# Patient Record
Sex: FEMALE | Race: WHITE
[De-identification: names, ages, dates, MRNs, and addresses within clinical notes are randomized per-mention and may not be internally consistent; named-entity substitution may affect disease eponyms.]

---

## 2018-10-06 ENCOUNTER — HOSPITAL ENCOUNTER (EMERGENCY)
Dept: HOSPITAL 56 - MW.ED | Age: 28
Discharge: HOME | End: 2018-10-06
Payer: COMMERCIAL

## 2018-10-06 DIAGNOSIS — N39.0: Primary | ICD-10-CM

## 2018-10-06 PROCEDURE — 81025 URINE PREGNANCY TEST: CPT

## 2018-10-06 PROCEDURE — 87086 URINE CULTURE/COLONY COUNT: CPT

## 2018-10-06 PROCEDURE — 96372 THER/PROPH/DIAG INJ SC/IM: CPT

## 2018-10-06 PROCEDURE — 81001 URINALYSIS AUTO W/SCOPE: CPT

## 2018-10-06 PROCEDURE — 99283 EMERGENCY DEPT VISIT LOW MDM: CPT

## 2018-10-06 PROCEDURE — 87088 URINE BACTERIA CULTURE: CPT

## 2018-10-06 PROCEDURE — 87186 SC STD MICRODIL/AGAR DIL: CPT

## 2018-10-06 NOTE — EDM.PDOC
ED HPI GENERAL MEDICAL PROBLEM





- General


Chief Complaint: Genitourinary Problem


Stated Complaint: blood in urine


Time Seen by Provider: 10/06/18 09:39


Source of Information: Reports: Patient


History Limitations: Reports: No Limitations





- History of Present Illness


INITIAL COMMENTS - FREE TEXT/NARRATIVE: 


History of present illness:


[]Patient's had 2 day history of urinary discomfort. She has had UTIs in the 

past but has never had blood in her urine until this morning. She has had some 

fevers and chills but no abdominal pain, nausea, vomiting or diarrhea.





Review of systems: 


As per history of present illness and below otherwise all systems reviewed and 

negative.





Past medical history: 


As per history of present illness and as reviewed below otherwise 

noncontributory.





Surgical history: 


As per history of present illness and as reviewed below otherwise 

noncontributory.





Social history: 


No reported history of drug or alcohol abuse.





Family history: 


As per history of present illness and as reviewed below otherwise 

noncontributory.





Physical exam:


General: Well developed, well nourished in NAD


HEENT: Atraumatic, normocephalic, pupils reactive, negative for conjunctival 

pallor or scleral icterus, mucous membranes moist, throat clear, neck supple, 

nontender, trachea midline.


Lungs: Clear to auscultation, breath sounds equal bilaterally, chest nontender.


Heart: S1S2, regular, negative for clicks, rubs, or JVD.


Abdomen: Soft, nondistended, nontender. Negative for masses or 

hepatosplenomegaly. Negative for costovertebral tenderness.


Pelvis: Stable nontender.


Genitourinary: Deferred.


Rectal: Deferred.


Extremities: Atraumatic, negative for cords or calf pain. Neurovascular 

unremarkable.


Neuro: Awake, alert, oriented. Cranial nerves II through XII unremarkable. 

Cerebellum unremarkable. Motor and sensory unremarkable throughout. Exam 

nonfocal.


Skin:warm and dry





Diagnostics:


UA-2 to count WBCs and RBCs positive leuk esterase, urine culture, hCG





Therapeutics:


Rocephin IM





ED Course:


Unremarkable





Impression: 


UTI





Prescriptions:


Bactrim, Pyridium





Plan:


Take meds as directed, increase fluids, follow-up with primary care, return if 

symptoms worsen or change.





Definitive disposition and diagnosis as appropriate pending reevaluation and 

review of above.





  ** Kidneys


Pain Score (Numeric/FACES): 8





- Related Data


 Allergies











Allergy/AdvReac Type Severity Reaction Status Date / Time


 


No Known Allergies Allergy   Verified 10/06/18 09:23











Home Meds: 


 Home Meds





Amitriptyline [Elavil] 5 mg PO DAILY 10/06/18 [History]


Phenazopyridine [Pyridium] 100 mg PO TID #9 tab 10/06/18 [Rx]


Sulfamethoxazole/Trimethoprim [Bactrim Ds Tablet] 1 each PO BID #20 tablet 10/06

/18 [Rx]











Past Medical History


HEENT History: Reports: None


Cardiovascular History: Reports: None


Respiratory History: Reports: None


Gastrointestinal History: Reports: None


Genitourinary History: Reports: UTI, Recurrent


OB/GYN History: Reports: None


Musculoskeletal History: Reports: None


Neurological History: Reports: None


Psychiatric History: Reports: None


Endocrine/Metabolic History: Reports: None


Dermatologic History: Reports: None





- Infectious Disease History


Infectious Disease History: Reports: Chicken Pox





- Past Surgical History


Female  Surgical History: Reports: Other (See Below)


Other Female  Surgeries/Procedures: "Kidney sugery" at 21 per patient.





Social & Family History





- Family History


Family Medical History: Noncontributory





- Tobacco Use


Smoking Status *Q: Never Smoker





- Caffeine Use


Caffeine Use: Reports: Coffee





- Recreational Drug Use


Recreational Drug Use: No





ED ROS GENERAL





- Review of Systems


Review Of Systems: ROS reveals no pertinent complaints other than HPI.





ED EXAM, RENAL/





- Physical Exam


Exam: See Below (see history of present illness)





Course





- Vital Signs


Last Recorded V/S: 


 Last Vital Signs











Temp  96.9 F   10/06/18 09:20


 


Pulse  91   10/06/18 09:20


 


Resp  16   10/06/18 09:20


 


BP  135/106 H  10/06/18 09:20


 


Pulse Ox  97   10/06/18 09:20














- Orders/Labs/Meds


Orders: 


 Active Orders 24 hr











 Category Date Time Status


 


 CULTURE URINE [RM] Stat Lab  10/06/18 09:30 Received


 


 cefTRIAXone [Rocephin] 1,000 mg Med  10/06/18 10:06 Active





 Lidocaine 1% [Xylocaine-MPF 1%] 1 ml   





 IM ONETIME   








 Medication Orders





Ceftriaxone Sodium 1,000 mg/ (Lidocaine HCl)  1 mls @ 1 mls/sec IM ONETIME ONE


   Stop: 10/06/18 10:07








Labs: 


 Laboratory Tests











  10/06/18 10/06/18 Range/Units





  09:30 09:30 


 


Urine Color  YELLOW   


 


Urine Appearance  SLT CLOUDY   


 


Urine pH  6.5   (5.0-8.0)  


 


Ur Specific Gravity  1.025   (1.001-1.035)  


 


Urine Protein  100   (NEGATIVE)  mg/dL


 


Urine Glucose (UA)  NEGATIVE   (NEGATIVE)  mg/dL


 


Urine Ketones  NEGATIVE   (NEGATIVE)  mg/dL


 


Urine Occult Blood  LARGE H   (NEGATIVE)  


 


Urine Nitrite  NEGATIVE   (NEGATIVE)  


 


Urine Bilirubin  SMALL H   (NEGATIVE)  


 


Urine Ictotest     


 


Urine Urobilinogen  0.2   (<2.0)  EU/dL


 


Ur Leukocyte Esterase  LARGE   (NEGATIVE)  


 


Urine RBC  TOO NUMEROUS TO CT H   (0-2/HPF)  


 


Urine WBC  TO NUMEROUS TO COUNT H   (0-5/HPF)  


 


Ur Epithelial Cells  FEW   (NONE-FEW)  


 


Urine Bacteria  1+ H   (NEGATIVE)  


 


Urinalysis Comment     


 


Urine HCG, Qual   NEGATIVE  (NEGATIVE)  











Meds: 


Medications











Generic Name Dose Route Start Last Admin





  Trade Name Freq  PRN Reason Stop Dose Admin


 


Ceftriaxone Sodium 1,000 mg/  1 mls @ 1 mls/sec  10/06/18 10:06  





  Lidocaine HCl  IM  10/06/18 10:07  





  ONETIME ONE   





     





     





     





     














Departure





- Departure


Time of Disposition: 10:20


Disposition: Home, Self-Care 01


Condition: Good


Clinical Impression: 


UTI (urinary tract infection)


Qualifiers:


 Urinary tract infection type: site unspecified Hematuria presence: with 

hematuria Qualified Code(s): N39.0 - Urinary tract infection, site not specified

; R31.9 - Hematuria, unspecified








- Discharge Information


*PRESCRIPTION DRUG MONITORING PROGRAM REVIEWED*: No


*COPY OF PRESCRIPTION DRUG MONITORING REPORT IN PATIENT LILIANA: No


Prescriptions: 


Phenazopyridine [Pyridium] 100 mg PO TID #9 tab


Sulfamethoxazole/Trimethoprim [Bactrim Ds Tablet] 1 each PO BID #20 tablet


Referrals: 


PCP,None [Primary Care Provider] - 


Forms:  ED Department Discharge


Additional Instructions: 


The following information is given to patients seen in the emergency department 

who are being discharged to home. This information is to outline your options 

for follow-up care. We provide all patients seen in our emergency department 

with a follow-up referral.





The need for follow-up, as well as the timing and circumstances, are variable 

depending upon the specifics of your emergency department visit.





If you don't have a primary care physician on staff, we will provide you with a 

referral. We always advise you to contact your personal physician following an 

emergency department visit to inform them of the circumstance of the visit and 

for follow-up with them and/or the need for any referrals to a consulting 

specialist.





The emergency department will also refer you to a specialist when appropriate. 

This referral assures that you have the opportunity for follow-up care with a 

specialist. All of these measure are taken in an effort to provide you with 

optimal care, which includes your follow-up.





Under all circumstances we always encourage you to contact your private 

physician who remains a resource for coordinating your care. When calling for 

follow-up care, please make the office aware that this follow-up is from your 

recent emergency room visit. If for any reason you are refused follow-up, 

please contact the [facility] Emergency 

Department at [phone] and asked to speak to the emergency department 

charge nurse.





Take meds as directed and increase fluids until urine is clear,  follow-up with 

primary care, return if symptoms worsen or change.





[facility]


Primary Care


[address] [place] [id number]


Phone: [phone]


Fax: [phone]





- My Orders


Last 24 Hours: 


My Active Orders





10/06/18 09:30


CULTURE URINE [RM] Stat 





10/06/18 10:06


cefTRIAXone [Rocephin] 1,000 mg   Lidocaine 1% [Xylocaine-MPF 1%] 1 ml IM 

ONETIME 














- Assessment/Plan


Last 24 Hours: 


My Active Orders





10/06/18 09:30


CULTURE URINE [RM] Stat 





10/06/18 10:06


cefTRIAXone [Rocephin] 1,000 mg   Lidocaine 1% [Xylocaine-MPF 1%] 1 ml IM 

ONETIME

## 2020-05-09 NOTE — PCM.PREANE
Preanesthetic Assessment





- Procedure


Proposed Procedure: 


Continuous Labor Epidural








- Anesthesia/Transfusion/Family Hx


Anesthesia History: Prior Anesthesia Without Reaction


Transfusion History: No Prior Transfusion(s)





- Review of Systems


General: No Symptoms


Pulmonary: No Symptoms


Cardiovascular: No Symptoms


Gastrointestinal: No Symptoms


Neurological: No Symptoms


Other: Reports: None





- Physical Assessment


Height: 5 ft 2 in


Weight: 86.183 kg


ASA Class: 2


Mental Status: Alert & Oriented x3


Airway Class: Mallampati = 2


Dentition: Reports: Normal Dentition


Thyro-Mental Finger Breadths: 3


Mouth Opening Finger Breadths: 3


ROM/Head Extension: Full


Lungs: Clear to Auscultation, Normal Respiratory Effort


Cardiovascular: Regular Rate, Regular Rhythm





- Lab


Values: 





 Laboratory Last Values











WBC  10.70 K/uL (4.0-11.0)   20  11:18    


 


RBC  3.91 M/uL (4.30-5.90)  L  20  11:18    


 


Hgb  12.5 g/dL (12.0-16.0)   20  11:18    


 


Hct  35.9 % (36.0-46.0)  L  20  11:18    


 


MCV  91.8 fL (80.0-98.0)   20  11:18    


 


MCH  32.0 pg (27.0-32.0)   20  11:18    


 


MCHC  34.8 g/dL (31.0-37.0)   20  11:18    


 


RDW Std Deviation  44.4 fl (28.0-62.0)   20  11:18    


 


RDW Coeff of Nneka  13 % (11.0-15.0)   20  11:18    


 


Plt Count  122 K/uL (150-400)  L  20  11:18    


 


MPV  11.00 fL (7.40-12.00)   20  11:18    


 


Nucleated RBC %  0.0 /100WBC  20  11:18    


 


Nucleated RBCs #  0 K/uL  20  11:18    


 


Sodium  136 mmol/L (136-145)   20  11:18    


 


Potassium  3.8 mmol/L (3.5-5.1)   20  11:18    


 


Chloride  102 mmol/L ()   20  11:18    


 


Carbon Dioxide  23.0 mmol/L (21.0-32.0)   20  11:18    


 


BUN  7 mg/dL (7.0-18.0)   20  11:18    


 


Creatinine  0.6 mg/dL (0.6-1.0)   20  11:18    


 


Est Cr Clr Drug Dosing  TNP   20  11:18    


 


Estimated GFR (MDRD)  > 60.0 ml/min  20  11:18    


 


Glucose  78 mg/dL ()   20  11:18    


 


Uric Acid  4.8 mg/dL (2.6-7.2)   20  11:18    


 


Calcium  8.3 mg/dL (8.5-10.1)  L  20  11:18    


 


Total Bilirubin  0.6 mg/dL (0.2-1.0)   20  11:18    


 


AST  20 IU/L (15-37)   20  11:18    


 


ALT  21 IU/L (14-63)   20  11:18    


 


Alkaline Phosphatase  135 U/L ()  H  20  11:18    


 


Total Protein  6.2 g/dL (6.4-8.2)  L  20  11:18    


 


Albumin  2.9 g/dL (3.4-5.0)  L  20  11:18    


 


Globulin  3.3 g/dL (2.6-4.0)   20  11:18    


 


Albumin/Globulin Ratio  0.9  (0.9-1.6)   20  11:18    


 


Urine Color  YELLOW   20  11:10    


 


Urine Appearance  CLEAR   20  11:10    


 


Urine pH  7.0  (5.0-8.0)   20  11:10    


 


Ur Specific Gravity  1.020  (1.001-1.035)   20  11:10    


 


Urine Protein  NEGATIVE mg/dL (NEGATIVE)   20  11:10    


 


Urine Glucose (UA)  NEGATIVE mg/dL (NEGATIVE)   20  11:10    


 


Urine Ketones  NEGATIVE mg/dL (NEGATIVE)   20  11:10    


 


Urine Occult Blood  TRACE-INTACT  (NEGATIVE)  H  20  11:10    


 


Urine Nitrite  NEGATIVE  (NEGATIVE)   20  11:10    


 


Urine Bilirubin  NEGATIVE  (NEGATIVE)   20  11:10    


 


Urine Urobilinogen  0.2 EU/dL (<2.0)   20  11:10    


 


Ur Leukocyte Esterase  NEGATIVE  (NEGATIVE)   20  11:10    


 


Ur Random Creatinine  97.8 mg/dL  20  11:00    


 


U Random Total Protein  14.7 mg/dL (<11.9)  H  20  11:00    


 


Protein/Creatinin Ratio  0.2   20  11:00    


 


Fetal Membrane Rupture  POSITIVE   20  10:40    


 


Blood Type  O POSITIVE   20  11:18    


 


Antibody Screen  NEGATIVE   20  11:18    














- Allergies


Allergies/Adverse Reactions: 


 Allergies











Allergy/AdvReac Type Severity Reaction Status Date / Time


 


No Known Allergies Allergy   Verified 10/06/18 09:23














- Anesthesia Plan


Free Text/Narrative:: 


Continuous Labor Epidural





- Acknowledgements


Anesthesia Type Planned: Spinal


Pt an Appropriate Candidate for the Planned Anesthesia: Yes


Alternatives and Risks of Anesthesia Discussed w Pt/Guardian: Yes


Pt/Guardian Understands and Agrees with Anesthesia Plan: Yes





PreAnesthesia Questionnaire


HEENT History: Reports: None


Cardiovascular History: Reports: Hypertension


Respiratory History: Reports: None


Gastrointestinal History: Reports: None


Genitourinary History: Reports: UTI, Recurrent


OB/GYN History: Reports: Pregnancy


: 1


Para: 0


LMP (Approximate): Pregnant


Musculoskeletal History: Reports: None


Neurological History: Reports: None


Psychiatric History: Reports: None


Endocrine/Metabolic History: Reports: None


Hematologic History: Reports: None


Immunologic History: Reports: None


Oncologic (Cancer) History: Reports: None


Dermatologic History: Reports: None





- Infectious Disease History


Infectious Disease History: Reports: Chicken Pox, Human Papilloma Virus (HPV)





- Past Surgical History


GI Surgical History: Reports: Appendectomy


Female  Surgical History: Reports: Other (See Below)


Other Female  Surgeries/Procedures: "Kidney sugery" at 21 per patient. Left 

ureteral stent & repair.


Oncologic Surgical History: Reports: Lumpectomy


Other Oncologic Surgeries/Procedures: Right Lumpectomy





- SUBSTANCE USE


Smoking Status *Q: Never Smoker


Second Hand Smoke Exposure: No


Recreational Drug Use History: No





- HOME MEDS


Home Medications: 


 Home Meds





Amitriptyline [Elavil] 5 mg PO DAILY 10/06/18 [History]


Phenazopyridine [Pyridium] 100 mg PO TID #9 tab 10/06/18 [Rx]


Sulfamethoxazole/Trimethoprim [Bactrim Ds Tablet] 1 each PO BID #20 tablet 10/06

/18 [Rx]


Acetaminophen [Tylenol] 1 cap PO ASDIRECTED PRN 20 [History]


Magnesium Oxide [Mag-Oxide] 200 mg PO DAILY 20 [History]


Nitrofurantoin Monohyd/M-Cryst [Macrobid 100 mg Capsule] 100 mg PO DAILY  [History]


Pnv No.95/Ferrous Fum/Folic AC [Prenatal Vitamin Tablet] 1 tab PO DAILY  [History]











- CURRENT (IN HOUSE) MEDS


Current Meds: 





 Current Medications





Butorphanol Tartrate (Stadol)  1 mg IVPUSH Q1H PRN


   PRN Reason: Pain


   Last Admin: 20 18:31 Dose:  1 mg


Carboprost Tromethamine (Hemabate Ds)  250 mcg IM ASDIRECTED PRN


   PRN Reason: Post Partum Hemorrhage


Tranexamic Acid 1,000 mg/ (Sodium Chloride)  110 mls @ 660 mls/hr IV ONETIME PRN


   PRN Reason: Bleeding


Lactated Ringer's (Ringers, Lactated)  1,000 mls @ 150 mls/hr IV ASDIRECTED VANIA


   Last Infusion: 20 20:25 Dose:  150 mls/hr


Oxytocin/Sodium Chloride (Oxytocin 30 Unit/500 Ml-Ns)  30 unit in 500 mls @ 500 

mls/hr IV ASDIRECTED VANIA


Oxytocin/Sodium Chloride (Oxytocin 30 Unit/500 Ml-Ns)  30 unit in 500 mls @ 2 

mls/hr IV TITRATE VANIA; Protocol


   Last Titration: 20 14:15 Dose:  8 munits/min, 8 mls/hr


Lidocaine HCl (Xylocaine 1%)  50 ml INJECT ONETIME PRN


   PRN Reason: Laceration repair


Methylergonovine Maleate (Methergine)  0.2 mg IM ASDIRECTED PRN


   PRN Reason: Post Partum Hemorrhage


Misoprostol (Cytotec)  200 mcg PO ONETIME PRN


   PRN Reason: Post Partum Hemorrhage


Nalbuphine HCl (Nubain)  10 mg IVPUSH Q1H PRN


   PRN Reason: Pain (severe 7-10)


Sodium Chloride (Saline Flush)  10 ml FLUSH ASDIRECTED PRN


   PRN Reason: Keep Vein Open


Sodium Chloride (Saline Flush)  2.5 ml FLUSH ASDIRECTED PRN


   PRN Reason: Keep Vein Open


Sodium Chloride (Normal Saline)  10 ml IV ASDIRECTED PRN


   PRN Reason: IV Use


Sterile Water (Sterile Water For Irrigation)  1,000 ml IRR ASDIRECTED PRN


   PRN Reason: delivery


Terbutaline Sulfate (Brethine)  0.25 mg SUBCUT ASDIRECTED PRN


   PRN Reason: Tacysystole





Discontinued Medications





Fentanyl/Bupivacaine HCl (Fentanyl/Bupivacaine/Ns 2 Mcg-0.125% 250 Ml) Confirm 

Administered Dose 250 mls @ as directed .ROUTE .[facility]


   Stop: 20 19:37

## 2020-05-10 NOTE — PCM.DEL
L & D Note





- General Info


Date of Service: 05/10/20


Mother's Due Date: 20





- Delivery Note


Labor: Induced by Oxytocin


Delivery Outcome: Livebirth


Infant Delivery Method: Spontaneous Vaginal Delivery-Single


Infant Delivery Mode: Spontaneous


Birth Presentation: Left Occiput Posterior (LOP)


Nuchal Cord: None


Prep: Other


Anesthesia Type: Epidural, Local


Anesthetic: Lidocaine (Xylocaine) 0.5% Plain


Local Anesthetic Volume: Other (10 ml)


Amniotic Fluid Description: Clear


Episiotomy Type: None


Laceration: Vaginal


Suture type: Vicryl


Suture size: 3-0


Placenta: Intact, Spontaneous


Cord: 3 Vessels


Estimated Blood Loss: 200


Resuscitation Needed: No


: Suctioned


APGAR Score 1 min: 8


APGAR Score 5 min: 9


Delivery Comments (Free Text/Narrative):: 





PROM with induction, gestational hypertension, negative pre-clamptic 

evaluation.  Liveborn Male 2880 grams. 





- General Info


Date of Service: 05/10/20





- Patient Data


Weight - Most Recent: 86.183 kg


Lab Results Last 24 Hours: 


 Laboratory Results - last 24 hr











  20 Range/Units





  10:40 11:00 11:10 


 


WBC     (4.0-11.0)  K/uL


 


RBC     (4.30-5.90)  M/uL


 


Hgb     (12.0-16.0)  g/dL


 


Hct     (36.0-46.0)  %


 


MCV     (80.0-98.0)  fL


 


MCH     (27.0-32.0)  pg


 


MCHC     (31.0-37.0)  g/dL


 


RDW Std Deviation     (28.0-62.0)  fl


 


RDW Coeff of Nneka     (11.0-15.0)  %


 


Plt Count     (150-400)  K/uL


 


MPV     (7.40-12.00)  fL


 


Nucleated RBC %     /100WBC


 


Nucleated RBCs #     K/uL


 


Sodium     (136-145)  mmol/L


 


Potassium     (3.5-5.1)  mmol/L


 


Chloride     ()  mmol/L


 


Carbon Dioxide     (21.0-32.0)  mmol/L


 


BUN     (7.0-18.0)  mg/dL


 


Creatinine     (0.6-1.0)  mg/dL


 


Est Cr Clr Drug Dosing     


 


Estimated GFR (MDRD)     ml/min


 


Glucose     ()  mg/dL


 


Uric Acid     (2.6-7.2)  mg/dL


 


Calcium     (8.5-10.1)  mg/dL


 


Total Bilirubin     (0.2-1.0)  mg/dL


 


AST     (15-37)  IU/L


 


ALT     (14-63)  IU/L


 


Alkaline Phosphatase     ()  U/L


 


Total Protein     (6.4-8.2)  g/dL


 


Albumin     (3.4-5.0)  g/dL


 


Globulin     (2.6-4.0)  g/dL


 


Albumin/Globulin Ratio     (0.9-1.6)  


 


Urine Color    YELLOW  


 


Urine Appearance    CLEAR  


 


Urine pH    7.0  (5.0-8.0)  


 


Ur Specific Gravity    1.020  (1.001-1.035)  


 


Urine Protein    NEGATIVE  (NEGATIVE)  mg/dL


 


Urine Glucose (UA)    NEGATIVE  (NEGATIVE)  mg/dL


 


Urine Ketones    NEGATIVE  (NEGATIVE)  mg/dL


 


Urine Occult Blood    TRACE-INTACT H  (NEGATIVE)  


 


Urine Nitrite    NEGATIVE  (NEGATIVE)  


 


Urine Bilirubin    NEGATIVE  (NEGATIVE)  


 


Urine Urobilinogen    0.2  (<2.0)  EU/dL


 


Ur Leukocyte Esterase    NEGATIVE  (NEGATIVE)  


 


Ur Random Creatinine   97.8   mg/dL


 


U Random Total Protein   14.7 H   (<11.9)  mg/dL


 


Protein/Creatinin Ratio   0.2   


 


Fetal Membrane Rupture  POSITIVE    


 


Blood Type     


 


Antibody Screen     














  20 Range/Units





  11:18 11:18 11:18 


 


WBC  10.70    (4.0-11.0)  K/uL


 


RBC  3.91 L    (4.30-5.90)  M/uL


 


Hgb  12.5    (12.0-16.0)  g/dL


 


Hct  35.9 L    (36.0-46.0)  %


 


MCV  91.8    (80.0-98.0)  fL


 


MCH  32.0    (27.0-32.0)  pg


 


MCHC  34.8    (31.0-37.0)  g/dL


 


RDW Std Deviation  44.4    (28.0-62.0)  fl


 


RDW Coeff of Nneka  13    (11.0-15.0)  %


 


Plt Count  122 L    (150-400)  K/uL


 


MPV  11.00    (7.40-12.00)  fL


 


Nucleated RBC %  0.0    /100WBC


 


Nucleated RBCs #  0    K/uL


 


Sodium   136   (136-145)  mmol/L


 


Potassium   3.8   (3.5-5.1)  mmol/L


 


Chloride   102   ()  mmol/L


 


Carbon Dioxide   23.0   (21.0-32.0)  mmol/L


 


BUN   7   (7.0-18.0)  mg/dL


 


Creatinine   0.6   (0.6-1.0)  mg/dL


 


Est Cr Clr Drug Dosing   TNP   


 


Estimated GFR (MDRD)   > 60.0   ml/min


 


Glucose   78   ()  mg/dL


 


Uric Acid   4.8   (2.6-7.2)  mg/dL


 


Calcium   8.3 L   (8.5-10.1)  mg/dL


 


Total Bilirubin   0.6   (0.2-1.0)  mg/dL


 


AST   20   (15-37)  IU/L


 


ALT   21   (14-63)  IU/L


 


Alkaline Phosphatase   135 H   ()  U/L


 


Total Protein   6.2 L   (6.4-8.2)  g/dL


 


Albumin   2.9 L   (3.4-5.0)  g/dL


 


Globulin   3.3   (2.6-4.0)  g/dL


 


Albumin/Globulin Ratio   0.9   (0.9-1.6)  


 


Urine Color     


 


Urine Appearance     


 


Urine pH     (5.0-8.0)  


 


Ur Specific Gravity     (1.001-1.035)  


 


Urine Protein     (NEGATIVE)  mg/dL


 


Urine Glucose (UA)     (NEGATIVE)  mg/dL


 


Urine Ketones     (NEGATIVE)  mg/dL


 


Urine Occult Blood     (NEGATIVE)  


 


Urine Nitrite     (NEGATIVE)  


 


Urine Bilirubin     (NEGATIVE)  


 


Urine Urobilinogen     (<2.0)  EU/dL


 


Ur Leukocyte Esterase     (NEGATIVE)  


 


Ur Random Creatinine     mg/dL


 


U Random Total Protein     (<11.9)  mg/dL


 


Protein/Creatinin Ratio     


 


Fetal Membrane Rupture     


 


Blood Type    O POSITIVE  


 


Antibody Screen    NEGATIVE  











Med Orders - Current: 


 Current Medications





Butorphanol Tartrate (Stadol)  1 mg IVPUSH Q1H PRN


   PRN Reason: Pain


   Last Admin: 20 18:31 Dose:  1 mg


Carboprost Tromethamine (Hemabate Ds)  250 mcg IM ASDIRECTED PRN


   PRN Reason: Post Partum Hemorrhage


Tranexamic Acid 1,000 mg/ (Sodium Chloride)  110 mls @ 660 mls/hr IV ONETIME PRN


   PRN Reason: Bleeding


Lactated Ringer's (Ringers, Lactated)  1,000 mls @ 150 mls/hr IV ASDIRECTED VANIA


   Last Admin: 20 23:59 Dose:  500 mls/hr


Oxytocin/Sodium Chloride (Oxytocin 30 Unit/500 Ml-Ns)  30 unit in 500 mls @ 500 

mls/hr IV ASDIRECTED VANIA


Oxytocin/Sodium Chloride (Oxytocin 30 Unit/500 Ml-Ns)  30 unit in 500 mls @ 2 

mls/hr IV TITRATE VANIA; Protocol


   Last Titration: 20 14:15 Dose:  8 munits/min, 8 mls/hr


Lidocaine HCl (Xylocaine 1%)  50 ml INJECT ONETIME PRN


   PRN Reason: Laceration repair


Methylergonovine Maleate (Methergine)  0.2 mg IM ASDIRECTED PRN


   PRN Reason: Post Partum Hemorrhage


Misoprostol (Cytotec)  200 mcg PO ONETIME PRN


   PRN Reason: Post Partum Hemorrhage


Nalbuphine HCl (Nubain)  10 mg IVPUSH Q1H PRN


   PRN Reason: Pain (severe 7-10)


Sodium Chloride (Saline Flush)  10 ml FLUSH ASDIRECTED PRN


   PRN Reason: Keep Vein Open


Sodium Chloride (Saline Flush)  2.5 ml FLUSH ASDIRECTED PRN


   PRN Reason: Keep Vein Open


Sodium Chloride (Normal Saline)  10 ml IV ASDIRECTED PRN


   PRN Reason: IV Use


Sterile Water (Sterile Water For Irrigation)  1,000 ml IRR ASDIRECTED PRN


   PRN Reason: delivery


Terbutaline Sulfate (Brethine)  0.25 mg SUBCUT ASDIRECTED PRN


   PRN Reason: Tacysystole





Discontinued Medications





Bupivacaine HCl (Sensorcaine-Mpf 0.25%) Confirm Administered Dose 10 ml .ROUTE 

.STK-MED ONE


   Stop: 20 23:53


Fentanyl (Sublimaze) Confirm Administered Dose 100 mcg .ROUTE .STK-MED ONE


   Stop: 20 23:26


Fentanyl/Bupivacaine HCl (Fentanyl/Bupivacaine/Ns 2 Mcg-0.125% 250 Ml) Confirm 

Administered Dose 250 mls @ as directed .ROUTE .STK-MED ONE


   Stop: 20 19:37











- Problem List & Annotations


(1) PROM with onset of labor within 24 hours, delivered, curr hospitaliz


SNOMED Code(s): 116523874, 899819500


   Code(s): O42.00 - EFREN ROM, ONSET LABOR W/N 24 HR OF RUPT, UNSP WEEKS OF 

GEST   Status: Acute   Current Visit: Yes   





(2) Vaginal delivery


SNOMED Code(s): 196884667


   Code(s): O80 - ENCOUNTER FOR FULL-TERM UNCOMPLICATED DELIVERY   Status: 

Acute   Current Visit: Yes   





(3) Gestational thrombocytopenia without hemorrhage


SNOMED Code(s): 673455072


   Code(s): O99.119 - OTH DIS OF BLD/BLD-FORM ORG/IMMUN MECHNSM COMP PREG,UNSP 

TRI; D69.6 - THROMBOCYTOPENIA, UNSPECIFIED   Status: Acute   Current Visit: Yes

   





(4) Chronic hypertension affecting pregnancy


SNOMED Code(s): 56738248


   Code(s): O10.919 - UNSP PRE-EXISTING HTN COMP PREGNANCY, UNSP TRIMESTER   

Status: Acute   Current Visit: Yes   





- Problem List Review


Problem List Initiated/Reviewed/Updated: Yes





- My Orders


Last 24 Hours: 


My Active Orders





20 10:52


Resuscitation Status Routine 





20 10:53


Fetal Non Stress Test [RC] PER UNIT ROUTINE 


Up ad Lynn [RC] ASDIRECTED 


Vaginal Exam [RC] Click to Edit 


Vital Signs [RC] PER UNIT ROUTINE 





20 11:18


RPR (SYPHILIS SERO) W/ RFLX [REF] Routine 





20 11:25


Patient Status [ADT] Routine 


Bedrest Bathroom Privileges [RC] ASDIRECTED 


Communication Order [RC] ASDIRECTED 


Communication Order [RC] ASDIRECTED 


Fetal Heart Tones [RC] CONTINUOUS 


May Shower [RC] ASDIRECTED 


Notify Provider [RC] PRN 


Notify Provider [RC] PRN 


Oxygen Therapy [RC] ASDIRECTED 


Up ad Lynn [RC] ASDIRECTED 


Vaginal Exam [RC] PRN 


Vaginal Exam [RC] PRN 


Vital Signs [RC] PER UNIT ROUTINE 


Vital Signs [RC] PER UNIT ROUTINE 


Butorphanol [Stadol]   1 mg IVPUSH Q1H PRN 


Carboprost Tromethamine [Hemabate DS]   250 mcg IM ASDIRECTED PRN 


Lidocaine 1% [Xylocaine 1%]   50 ml INJECT ONETIME PRN 


Methylergonovine [Methergine]   0.2 mg IM ASDIRECTED PRN 


Nalbuphine [Nubain]   10 mg IVPUSH Q1H PRN 


Sodium Chloride 0.9% [Normal Saline]   10 ml IV ASDIRECTED PRN 


Sodium Chloride 0.9% [Saline Flush]   10 ml FLUSH ASDIRECTED PRN 


Sodium Chloride 0.9% [Saline Flush]   2.5 ml FLUSH ASDIRECTED PRN 


Terbutaline [Brethine]   0.25 mg SUBCUT ASDIRECTED PRN 


Tranexamic Acid [Cyklokapron] 1,000 mg   Sodium Chloride 0.9% [Normal Saline] 

100 ml IV ONETIME 


Water For Irrigation,Sterile [Sterile Water for Irrigation]   1,000 ml IRR 

ASDIRECTED PRN 


miSOPROStoL [Cytotec]   200 mcg PO ONETIME PRN 


Fetal Scalp Electrode [WOMSER] Per Unit Routine 


Peripheral IV Insertion Adult [OM.PC] Routine 





20 11:30


Lactated Ringers [Ringers, Lactated] 1,000 ml IV ASDIRECTED 


Oxytocin/0.9 % Sodium Chloride [Oxytocin 30 Unit/500 ML-NS] 30 unit in 500 ml 

IV ASDIRECTED 


Oxytocin/0.9 % Sodium Chloride [Oxytocin 30 Unit/500 ML-NS] 30 unit in 500 ml 

IV TITRATE

## 2020-05-10 NOTE — PCM.PNPP
- General Info


Date of Service: 05/10/20


Functional Status: Reports: Pain Controlled, Tolerating Diet, Ambulating, 

Urinating





- Review of Systems


General: Reports: No Symptoms


HEENT: Reports: No Symptoms


Pulmonary: Reports: No Symptoms


Cardiovascular: Reports: No Symptoms


Gastrointestinal: Reports: No Symptoms


Genitourinary: Reports: No Symptoms


Musculoskeletal: Reports: No Symptoms


Skin: Reports: No Symptoms


Neurological: Reports: No Symptoms


Psychiatric: Reports: No Symptoms





- General Info


Date of Service: 05/10/20





- Patient Data


Vital Signs - Most Recent: 


 Last Vital Signs











Temp  36.4 C   05/10/20 07:43


 


Pulse  97   05/10/20 07:43


 


Resp  16   05/10/20 07:43


 


BP  121/84   05/10/20 07:43


 


Pulse Ox  100   05/10/20 07:43











Weight - Most Recent: 86.183 kg


Lab Results - Last 24 Hours: 


 Laboratory Results - last 24 hr











  05/10/20 05/10/20 Range/Units





  02:26 06:55 


 


WBC   20.36 H  (4.0-11.0)  K/uL


 


RBC   4.05 L  (4.30-5.90)  M/uL


 


Hgb   12.9  (12.0-16.0)  g/dL


 


Hct   37.3  (36.0-46.0)  %


 


MCV   92.1  (80.0-98.0)  fL


 


MCH   31.9  (27.0-32.0)  pg


 


MCHC   34.6  (31.0-37.0)  g/dL


 


RDW Std Deviation   44.2  (28.0-62.0)  fl


 


RDW Coeff of Nneka   13  (11.0-15.0)  %


 


Plt Count   145 L  (150-400)  K/uL


 


MPV   10.60  (7.40-12.00)  fL


 


Nucleated RBC %   0.0  /100WBC


 


Nucleated RBCs #   0  K/uL


 


Cord ABG pH  7.332   (7.18-7.38)  


 


Cord ABG Base Excess  -5   (-10--2)  


 


Cord VBG pH  7.439   (7.25-7.45)  


 


Cord VBG Base Excess  -4   (-10--2)  











Med Orders - Current: 


 Current Medications





Acetaminophen (Tylenol Extra Strength)  500 mg PO Q4H PRN


   PRN Reason: Pain


Acetaminophen (Tylenol Extra Strength)  1,000 mg PO Q4H PRN


   PRN Reason: Pain


Benzocaine/Menthol (Dermoplast Pain Relief 20%-0.5% Spray)  78 gm TOP 

ASDIRECTED PRN


   PRN Reason: Perineal Comfort Measure


Bisacodyl (Dulcolax)  10 mg RECTAL ONETIME PRN


   PRN Reason: Constipation


Docusate Sodium (Colace)  100 mg PO BID PRN


   PRN Reason: Constipation


Emollient Ointment (Lansinoh Hpa)  0 gm TOP ASDIRECTED PRN


   PRN Reason: Sore Nipples


Tranexamic Acid 1,000 mg/ (Sodium Chloride)  110 mls @ 660 mls/hr IV ONETIME PRN


   PRN Reason: Bleeding


Ibuprofen (Motrin)  400 mg PO Q4H PRN


   PRN Reason: Pain


Ibuprofen (Motrin)  800 mg PO Q6H PRN


   PRN Reason: Pain


   Last Admin: 05/10/20 09:33 Dose:  800 mg


Witch Hazel (Tucks)  1 pad TOP ASDIRECTED PRN


   PRN Reason: comfort care





Discontinued Medications





Bupivacaine HCl (Sensorcaine-Mpf 0.25%) Confirm Administered Dose 10 ml .ROUTE 

.STK-MED ONE


   Stop: 20 23:53


Butorphanol Tartrate (Stadol)  1 mg IVPUSH Q1H PRN


   PRN Reason: Pain


   Last Admin: 20 18:31 Dose:  1 mg


Carboprost Tromethamine (Hemabate Ds)  250 mcg IM ASDIRECTED PRN


   PRN Reason: Post Partum Hemorrhage


Fentanyl (Sublimaze) Confirm Administered Dose 100 mcg .ROUTE .STK-MED ONE


   Stop: 20 23:26


Tranexamic Acid 1,000 mg/ (Sodium Chloride)  110 mls @ 660 mls/hr IV ONETIME PRN


   PRN Reason: Bleeding


Lactated Ringer's (Ringers, Lactated)  1,000 mls @ 150 mls/hr IV ASDIRECTED [facility]


   Last Admin: 20 23:59 Dose:  500 mls/hr


Oxytocin/Sodium Chloride (Oxytocin 30 Unit/500 Ml-Ns)  30 unit in 500 mls @ 500 

mls/hr IV ASDIRECTED [facility]


   Last Infusion: 05/10/20 02:40 Dose:  500 mls/hr


Oxytocin/Sodium Chloride (Oxytocin 30 Unit/500 Ml-Ns)  30 unit in 500 mls @ 2 

mls/hr IV TITRATE VANIA; Protocol


   Last Titration: 05/10/20 02:25 Dose:  0 munits/min, 0 mls/hr


Fentanyl/Bupivacaine HCl (Fentanyl/Bupivacaine/Ns 2 Mcg-0.125% 250 Ml) Confirm 

Administered Dose 250 mls @ as directed .ROUTE .STK-MED ONE


   Stop: 20 19:37


Lidocaine HCl (Xylocaine 1%)  50 ml INJECT ONETIME PRN


   PRN Reason: Laceration repair


   Last Admin: 05/10/20 02:35 Dose:  50 ml


Methylergonovine Maleate (Methergine)  0.2 mg IM ASDIRECTED PRN


   PRN Reason: Post Partum Hemorrhage


Misoprostol (Cytotec)  200 mcg PO ONETIME PRN


   PRN Reason: Post Partum Hemorrhage


Nalbuphine HCl (Nubain)  10 mg IVPUSH Q1H PRN


   PRN Reason: Pain (severe 7-10)


Sodium Chloride (Saline Flush)  10 ml FLUSH ASDIRECTED PRN


   PRN Reason: Keep Vein Open


Sodium Chloride (Saline Flush)  2.5 ml FLUSH ASDIRECTED PRN


   PRN Reason: Keep Vein Open


Sodium Chloride (Normal Saline)  10 ml IV ASDIRECTED PRN


   PRN Reason: IV Use


Sterile Water (Sterile Water For Irrigation)  1,000 ml IRR ASDIRECTED PRN


   PRN Reason: delivery


   Last Admin: 05/10/20 02:30 Dose:  1,000 ml


Terbutaline Sulfate (Brethine)  0.25 mg SUBCUT ASDIRECTED PRN


   PRN Reason: Tacysystole











- Infant Interaction


Infant Disposition, Postpartum: [place] in Room with Family


Support Person: Significant Other





- Postpartum Recovery Exam


Fundal Tone: Firm


Fundal Level: 1 Fingerbreadths Below Umbilicus


Fundal Placement: Midline


Lochia Amount: Scant


Lochia Color: Rubra/Red


Perineum Description: Intact, Minimal Bruising/Swelling


Episiotomy/Laceration: None


Bladder Status: Voiding





- Exam


Physical Findings Comment:: 





patient is sleeping well.





- Problem List & Annotations


(1) PROM with onset of labor within 24 hours, delivered, curr hospitaliz


SNOMED Code(s): 939051581, 443358316


   Code(s): O42.00 - EFREN ROM, ONSET LABOR W/N 24 HR OF RUPT, UNSP WEEKS OF 

GEST   Status: Acute   Current Visit: Yes   





(2) Vaginal delivery


SNOMED Code(s): 195122328


   Code(s): O80 - ENCOUNTER FOR FULL-TERM UNCOMPLICATED DELIVERY   Status: 

Acute   Current Visit: Yes   





(3) Gestational thrombocytopenia without hemorrhage


SNOMED Code(s): 704739615


   Code(s): O99.119 - OTH DIS OF BLD/BLD-FORM ORG/IMMUN MECHNSM COMP PREG,UNSP 

TRI; D69.6 - THROMBOCYTOPENIA, UNSPECIFIED   Status: Acute   Current Visit: Yes

   





(4) Chronic hypertension affecting pregnancy


SNOMED Code(s): 61740080


   Code(s): O10.919 - UNSP PRE-EXISTING HTN COMP PREGNANCY, UNSP TRIMESTER   

Status: Acute   Current Visit: Yes   





- Problem List Review


Problem List Initiated/Reviewed/Updated: Yes





- My Orders


Last 24 Hours: 


My Active Orders





05/10/20 02:48


Acetaminophen [Tylenol Extra Strength]   1,000 mg PO Q4H PRN 


Acetaminophen [Tylenol Extra Strength]   500 mg PO Q4H PRN 


Benzocaine/Menthol [Dermoplast Pain Relief 20%-0.5% Spray]   78 gm TOP 

ASDIRECTED PRN 


Docusate Sodium [Colace]   100 mg PO BID PRN 


Ibuprofen [Motrin]   400 mg PO Q4H PRN 


Ibuprofen [Motrin]   800 mg PO Q6H PRN 


Lanolin [Lansinoh HPA]   See Dose Instructions  TOP ASDIRECTED PRN 


Tranexamic Acid [Cyklokapron] 1,000 mg   Sodium Chloride 0.9% [Normal Saline] 

100 ml IV ONETIME 


bisacodyL [Dulcolax]   10 mg RECTAL ONETIME PRN 


witch hazeL [Tucks]   1 pad TOP ASDIRECTED PRN 





05/10/20 02:49


May Shower [RC] ASDIRECTED 


Up ad Lynn [RC] ASDIRECTED 


Vital Signs [RC] PER UNIT ROUTINE 


Assess Lochia [WOMSER] Per Unit Routine 


Assess Uterine Involution [WOMSER] Per Unit Routine 


Perineal Care [OM.PC] Per Unit Routine 


Peripheral IV Discontinue [OM.PC] Routine 





05/10/20 Breakfast


Regular Diet [DIET] 














- Assessment


Assessment:: 


PPD 0 after , PROM with induction, BP improved after delivery








- Plan


Plan:: 


Continue postpartum care.

## 2020-05-10 NOTE — PCM.SN.2
- Free Text/Narrative


Note: 


5/9/2020 at 2315: Called for Patient with inadequate pain control. Patient 

dilated to 8 cm and rating pain 6/10 with contractions. Fentanyl 100 mcg given 

at 2330. Changed pump settings to 6 ml continuous infusion, 6 ml bolus option 

every 10 minutes with lockout max dose of 24ml/hr. Patient bolus 6 ml initiated 

via pump at approximately 2336. At 2043, patient continues to rate pain 5/10. 

Clinician bolus of 3 ml given via pump. Vital signs remain stable throughout. 

Patient repositioned by RN. At 2350, patient continues with minimal relief and 

rates pain 5/10. Bupivacaine 0.25% 5 ml given manually at 2352. At 0005, 

patient rates pain 2/10 and feels comfortable at this time. Dr. Pinto has been 

here and is assessing patient. Will continue to monitor as needed.

## 2020-05-10 NOTE — [id number]
Post Anesthesia Note





- EVALUATION WITHIN 48HRS OF ANESTHETIC


Vital Signs in Normal Range: Yes


Patient Participated in Evaluation: Yes


Respiratory Function Stable: Yes


Airway Patent: Yes


Cardiovascular Function Stable: Yes


Hydration Status Stable: Yes


Pain Control Satisfactory: Yes


Nausea and Vomiting Control Satisfactory: Yes


Mental Status Recovered: Yes


Vital Signs: 


 Last Vital Signs











Temp  36.4 C   05/10/20 07:43


 


Pulse  97   05/10/20 07:43


 


Resp  16   05/10/20 07:43


 


BP  121/84   05/10/20 07:43


 


Pulse Ox  100   05/10/20 07:43














- COMMENTS/OBSERVATIONS


Free Text/Narrative:: 





Patient lying in bed. Has has been up without difficulty. No anesthesia 

complaints or concerns at this time. No anesthesia complications noted.

## 2020-05-10 NOTE — OR
SURGEON:

Sada Pinto M.D.

 

DATE OF PROCEDURE:  05/10/2020

 

PREOPERATIVE DIAGNOSES:

1. A 38-6/7-week intrauterine pregnancy.

2. Premature rupture of membranes with induction.

3. Chronic hypertension.

4. Gestational thrombocytopenia.

 

POSTOPERATIVE DIAGNOSES:

1. A 38-6/7-week intrauterine pregnancy.

2. Premature rupture of membranes with induction.

3. Chronic hypertension.

4. Gestational thrombocytopenia.

 

PROCEDURES:

1. Pitocin induction of labor.

2. Term spontaneous vaginal delivery.

3. Repair of a vaginal laceration.

 

PRIMARY SURGEON:

Sada Pinto M.D.

 

ANESTHESIA:

Epidural and local.

 

ESTIMATED BLOOD LOSS:

Less than 200 mL.

 

FINDINGS:

A liveborn male, Apgar scores 8 and 9, weighing 2880 g.  Placenta, spontaneous,

Schultze, intact, with 3 vessels.  A vaginal laceration was repaired.

 

COMPLICATIONS:

None known.

 

DISPOSITION:

Mother and baby are in the LDR in good condition.

 

BRIEF HISTORY:

This is a 29-year-old female.  She is G1, P0; presents at 38-5/7 weeks'

gestation, spontaneous rupture of membranes, clear fluid, 3 cm; was started on

Pitocin overall through labor.  Category I fetal heart tones.  Initially, she

had elevated blood pressures on presentation.  She is known to have chronic

hypertension but has not required medications through pregnancy.  She had

preeclamptic lab workup, which was negative, particularly with no proteinuria.

She has had thrombocytopenia through the 3rd trimester, and platelets were

stable relative to those drawn in the clinic at 122.  She progressed to

complete.

 

DESCRIPTION OF PROCEDURE:

With the patient in the dorsal lithotomy position, the patient pushed over a 2-

hour time period to a 5+ station, at which time the head was delivered

spontaneously and atraumatically over the perineum with support with subsequent

delivery of the infant's shoulders and body without any difficulty.  The infant

was bulb suctioned by nose and mouth, and after the cord had ceased to pulsate,

it was doubly clamped and cut.  The infant was handed to the mother in the

presence of the nurse attending the delivery.  The infant was a liveborn male,

Apgar scores 8 and 9, weighing 2880 g.  Cord blood was collected for cord ABGs

as well as routine cord blood sampling.  Pitocin was initiated after delivery of

the infant to assist with delivery of the placenta, which was delivered

spontaneously, Schultze, intact, with 3 vessels.  Upon inspection of the pelvis

and perineum, there were no periurethral, vaginal sidewall, cervical, rectal, or

perineal lacerations.  There was a small vaginal lower-segment laceration

extending approximately 3 cm.  1% lidocaine was injected, and 3-0 Vicryl was

utilized in a running locked fashion to reapproximate the tissue.  Final sponge,

needle, and instrument counts were correct.  There were no known complications.

Mother and baby remained in the LDR in good condition.

 

 

KATY SAEZ

DD:  05/10/2020 02:54:50

DT:  05/10/2020 03:26:59

Job #:  [id number]

## 2020-05-11 NOTE — PCM.PNPP
- General Info


Date of Service: 20


Functional Status: Reports: Pain Controlled, Tolerating Diet, Ambulating, 

Urinating





- Review of Systems


General: Reports: No Symptoms


HEENT: Reports: No Symptoms


Pulmonary: Reports: No Symptoms


Cardiovascular: Reports: No Symptoms


Gastrointestinal: Reports: No Symptoms


Genitourinary: Reports: No Symptoms


Musculoskeletal: Reports: No Symptoms


Skin: Reports: No Symptoms


Neurological: Reports: No Symptoms


Psychiatric: Reports: No Symptoms





- Patient Data


Vital Signs - Most Recent: 


 Last Vital Signs











Temp  36.5 C   20 05:11


 


Pulse  89   20 05:11


 


Resp  18   20 05:11


 


BP  132/95 H  20 05:11


 


Pulse Ox  96   20 05:11











Weight - Most Recent: 190 lb


Med Orders - Current: 


 Current Medications





Acetaminophen (Tylenol Extra Strength)  500 mg PO Q4H PRN


   PRN Reason: Pain


Acetaminophen (Tylenol Extra Strength)  1,000 mg PO Q4H PRN


   PRN Reason: Pain


Benzocaine/Menthol (Dermoplast Pain Relief 20%-0.5% Spray)  78 gm TOP 

ASDIRECTED PRN


   PRN Reason: Perineal Comfort Measure


Bisacodyl (Dulcolax)  10 mg RECTAL ONETIME PRN


   PRN Reason: Constipation


Docusate Sodium (Colace)  100 mg PO BID PRN


   PRN Reason: Constipation


Emollient Ointment (Lansinoh Hpa)  0 gm TOP ASDIRECTED PRN


   PRN Reason: Sore Nipples


   Last Admin: 05/10/20 20:54 Dose:  7 gm


Tranexamic Acid 1,000 mg/ (Sodium Chloride)  110 mls @ 660 mls/hr IV ONETIME PRN


   PRN Reason: Bleeding


Ibuprofen (Motrin)  400 mg PO Q4H PRN


   PRN Reason: Pain


Ibuprofen (Motrin)  800 mg PO Q6H PRN


   PRN Reason: Pain


   Last Admin: 20 06:56 Dose:  800 mg


Witch Hazel (Tucks)  1 pad TOP ASDIRECTED PRN


   PRN Reason: comfort care





Discontinued Medications





Bupivacaine HCl (Sensorcaine-Mpf 0.25%) Confirm Administered Dose 10 ml .ROUTE 

.STK-MED ONE


   Stop: 20 23:53


Butorphanol Tartrate (Stadol)  1 mg IVPUSH Q1H PRN


   PRN Reason: Pain


   Last Admin: 20 18:31 Dose:  1 mg


Carboprost Tromethamine (Hemabate Ds)  250 mcg IM ASDIRECTED PRN


   PRN Reason: Post Partum Hemorrhage


Fentanyl (Sublimaze) Confirm Administered Dose 100 mcg .ROUTE .AppSpotr-MED ONE


   Stop: 20 23:26


Tranexamic Acid 1,000 mg/ (Sodium Chloride)  110 mls @ 660 mls/hr IV ONETIME PRN


   PRN Reason: Bleeding


Lactated Ringer's (Ringers, Lactated)  1,000 mls @ 150 mls/hr IV ASDIRECTED VANIA


   Last Admin: 20 23:59 Dose:  500 mls/hr


Oxytocin/Sodium Chloride (Oxytocin 30 Unit/500 Ml-Ns)  30 unit in 500 mls @ 500 

mls/hr IV ASDIRECTED VANIA


   Last Infusion: 05/10/20 02:40 Dose:  500 mls/hr


Oxytocin/Sodium Chloride (Oxytocin 30 Unit/500 Ml-Ns)  30 unit in 500 mls @ 2 

mls/hr IV TITRATE VANIA; Protocol


   Last Titration: 05/10/20 02:25 Dose:  0 munits/min, 0 mls/hr


Fentanyl/Bupivacaine HCl (Fentanyl/Bupivacaine/Ns 2 Mcg-0.125% 250 Ml) Confirm 

Administered Dose 250 mls @ as directed .ROUTE .STK-MED ONE


   Stop: 20 19:37


Lidocaine HCl (Xylocaine 1%)  50 ml INJECT ONETIME PRN


   PRN Reason: Laceration repair


   Last Admin: 05/10/20 02:35 Dose:  50 ml


Methylergonovine Maleate (Methergine)  0.2 mg IM ASDIRECTED PRN


   PRN Reason: Post Partum Hemorrhage


Misoprostol (Cytotec)  200 mcg PO ONETIME PRN


   PRN Reason: Post Partum Hemorrhage


Nalbuphine HCl (Nubain)  10 mg IVPUSH Q1H PRN


   PRN Reason: Pain (severe 7-10)


Sodium Chloride (Saline Flush)  10 ml FLUSH ASDIRECTED PRN


   PRN Reason: Keep Vein Open


Sodium Chloride (Saline Flush)  2.5 ml FLUSH ASDIRECTED PRN


   PRN Reason: Keep Vein Open


Sodium Chloride (Normal Saline)  10 ml IV ASDIRECTED PRN


   PRN Reason: IV Use


Sterile Water (Sterile Water For Irrigation)  1,000 ml IRR ASDIRECTED PRN


   PRN Reason: delivery


   Last Admin: 05/10/20 02:30 Dose:  1,000 ml


Terbutaline Sulfate (Brethine)  0.25 mg SUBCUT ASDIRECTED PRN


   PRN Reason: Tacysystole











- Infant Interaction


Infant Disposition, Postpartum: [place] in Room with Family


Infant Interaction: Holding Infant


Infant Feeding:  Infant; Nursed Well


Support Person: Significant Other





- Postpartum Recovery Exam


Fundal Tone: Firm


Fundal Level: 1 Fingerbreadths Below Umbilicus


Fundal Placement: Midline


Lochia Amount: Scant


Lochia Color: Rubra/Red


Perineum Description: Intact, Minimal Bruising/Swelling


Episiotomy/Laceration: None


Bladder Status: Voiding





- Exam


General: Alert, Oriented, Cooperative, No Acute Distress


HEENT: Pupils Equal, Pupils Reactive


Neck: Supple, Trachea Midline, No JVD


Lungs: Normal Respiratory Effort


GI/Abdominal Exam: Normal Bowel Sounds, Soft, Non-Tender, No Distention


Extremities: Normal Inspection, Normal Range of Motion, Non-Tender, No Pedal 

Edema


Skin: Warm, Dry, Intact


Wound/Incisions: Healing Well


Neurological: No New Focal Deficit


Psy/Mental Status: Alert, Normal Affect, Normal Mood





- Problem List Review


Problem List Initiated/Reviewed/Updated: Yes





- My Orders


Last 24 Hours: 


My Active Orders





20 10:47


Ready for Discharge [RC] PER UNIT ROUTINE 














- Assessment


Assessment:: 


28yo  with chronic HTN, gestational thrombocytopenia. PPD 1 after , 

PROM with induction, BP improved after delivery








- Plan


Plan:: 


BP wnl, not on antihypertensives


Hgb stable, plt are improving, minimal bleeding


Tolerating PO, ambulating and voiding


Stable for discharge home. BP check in 1 week. Reviewed postpartum care 

instructions.